# Patient Record
Sex: FEMALE | Race: WHITE
[De-identification: names, ages, dates, MRNs, and addresses within clinical notes are randomized per-mention and may not be internally consistent; named-entity substitution may affect disease eponyms.]

---

## 2019-04-02 ENCOUNTER — HOSPITAL ENCOUNTER (EMERGENCY)
Dept: HOSPITAL 26 - MED | Age: 44
Discharge: HOME | End: 2019-04-02
Payer: COMMERCIAL

## 2019-04-02 VITALS — DIASTOLIC BLOOD PRESSURE: 97 MMHG | SYSTOLIC BLOOD PRESSURE: 143 MMHG

## 2019-04-02 VITALS — WEIGHT: 140 LBS | BODY MASS INDEX: 23.32 KG/M2 | HEIGHT: 65 IN

## 2019-04-02 VITALS — SYSTOLIC BLOOD PRESSURE: 147 MMHG | DIASTOLIC BLOOD PRESSURE: 98 MMHG

## 2019-04-02 DIAGNOSIS — J06.9: Primary | ICD-10-CM

## 2019-04-02 DIAGNOSIS — R07.9: ICD-10-CM

## 2019-04-02 PROCEDURE — 99283 EMERGENCY DEPT VISIT LOW MDM: CPT

## 2019-04-02 PROCEDURE — 71045 X-RAY EXAM CHEST 1 VIEW: CPT

## 2019-04-02 PROCEDURE — 93005 ELECTROCARDIOGRAM TRACING: CPT

## 2019-04-02 NOTE — NUR
PT ACTING APPROPRIATLY, PT ON CARDIAC MONITOR. PAIN 4/10 AT THIS TIME. FAMILY 
AT BEDSIDE. SAFETY PRECAUTIONS IN PLACE.

## 2019-04-02 NOTE — NUR
Patient discharged with v/s stable. Written and verbal after care instructions 
given and explained. 

Patient alert, oriented and verbalized understanding of instructions. 
Ambulatory with steady gait. All questions addressed prior to discharge. ID 
band removed. Patient advised to follow up with PMD. Rx of AZITHROMYCIN, 
PROMETHAZINE given. Patient educated on indication of medication including 
possible reaction and side effects. Opportunity to ask questions provided and 
answered.

## 2019-04-02 NOTE — NUR
PT C/O CONGESTION AND NONPRODUCTIVE COUGH X 1 WEEK. LUNG SOUNDS WHEEZING BILAT 
ON INSPIRATION AND EXPIRATION THROUGHOUT. RESPIRATIONS EVEN AND UNLABORED. 
STATES SHE HAS CONGESTION/TIGHTNESS IN CHEST ON INSPIRATION 5/10, NONRADIATING. 
PATIENT POSITIONED FOR COMFORT; HOB ELEVATED; BEDRAILS UP X1; BED DOWN, LOCKED.